# Patient Record
Sex: FEMALE | Race: BLACK OR AFRICAN AMERICAN | Employment: FULL TIME | URBAN - METROPOLITAN AREA
[De-identification: names, ages, dates, MRNs, and addresses within clinical notes are randomized per-mention and may not be internally consistent; named-entity substitution may affect disease eponyms.]

---

## 2021-10-21 ENCOUNTER — TELEPHONE (OUTPATIENT)
Dept: FAMILY MEDICINE CLINIC | Age: 56
End: 2021-10-21

## 2021-10-21 NOTE — TELEPHONE ENCOUNTER
----- Message from Олег Marley sent at 10/15/2021  2:12 PM EDT -----  Subject: Appointment Request    Reason for Call: New Patient Request Appointment    QUESTIONS  Type of Appointment? New Patient/New to Provider  Reason for appointment request? Available appointments did not meet   patient need  Additional Information for Provider? Pt has appt on  with dr Danilo Blake at   the [place] location but wanted the [facility] location, wants to   see if she can switch that location and get a sooner appt if possible   ---------------------------------------------------------------------------  --------------  [address]  What is the best way for the office to contact you? OK to leave message on   voicemail  Preferred Call Back Phone Number? [phone]  ---------------------------------------------------------------------------  --------------  SCRIPT ANSWERS  Relationship to Patient? Self  Specialty Confirmation? Primary Care  Is this the first appointment to establish care for a ? No  Have you been diagnosed with, awaiting test results for, or told that you   are suspected of having COVID-19 (Coronavirus)? (If patient has tested   negative or was tested as a requirement for work, school, or travel and   not based on symptoms, answer no)? No  Within the past two weeks have you developed any of the following symptoms   (answer no if symptoms have been present longer than 2 weeks or began   more than 2 weeks ago)? Fever or Chills, Cough, Shortness of breath or   difficulty breathing, Loss of taste or smell, Sore throat, Nasal   congestion, Sneezing or runny nose, Fatigue or generalized body aches   (answer no if pain is specific to a body part e.g. back pain), Diarrhea,   Headache? No  Have you had close contact with someone with COVID-19 in the last 14 days? No  (Service Expert  click yes below to proceed with [employer] As Usual   Scheduling)?  Yes

## 2021-11-03 ENCOUNTER — HOSPITAL ENCOUNTER (EMERGENCY)
Age: 56
Discharge: HOME | End: 2021-11-03
Attending: EMERGENCY MEDICINE
Payer: MEDICAID

## 2021-11-03 ENCOUNTER — APPOINTMENT (OUTPATIENT)
Dept: VASCULAR SURGERY | Age: 56
End: 2021-11-03
Attending: EMERGENCY MEDICINE
Payer: MEDICAID

## 2021-11-03 VITALS
WEIGHT: 210 LBS | HEIGHT: 68 IN | DIASTOLIC BLOOD PRESSURE: 90 MMHG | TEMPERATURE: 96.8 F | SYSTOLIC BLOOD PRESSURE: 150 MMHG | HEART RATE: 61 BPM | RESPIRATION RATE: 16 BRPM | BODY MASS INDEX: 31.83 KG/M2 | OXYGEN SATURATION: 98 %

## 2021-11-03 DIAGNOSIS — M79.605 LEG PAIN, MEDIAL, LEFT: Primary | ICD-10-CM

## 2021-11-03 PROCEDURE — 93971 EXTREMITY STUDY: CPT

## 2021-11-03 PROCEDURE — 99281 EMR DPT VST MAYX REQ PHY/QHP: CPT

## 2021-11-03 NOTE — PROGRESS NOTES
Left LE venous duplex completed. Verbal preliminary results given to Dr. Kinsey Baez. Final results to follow.

## 2021-11-03 NOTE — ED PROVIDER NOTES
Patient is a pleasant 67-year-old female with past medical history significant for prior PE, DVT some 14 years ago no longer on anticoagulation who presents emergency department for left leg pain. She states she traveled to [place] back in September. She also states she has been exercising a fair amount lately but had progression of her left lower leg discomfort and so wanted to come in to verify she did not have a DVT. She states 14 years ago when she had a clot she never really had any leg pain and was just short of breath. She states at that time they found clots in her lungs and her legs. She does note that she was diagnosed with a clots while pregnant but had actually been feeling short of breath prior to being pregnant. She also notes that she traveled a great deal with time and that they thought that maybe the travel was the cause of her blood clots. Denies any diagnosis of thromboembolus since that time. Denies any chest pain or shortness of breath. Denies any rash or redness or known injury. No difficulty ambulating. Past Medical History:   Diagnosis Date    Asthma     AS A CHILD    History of blood transfusion     ST[address]  [address], NO REACTION    Thromboembolus [facility]) 2005    PE       Past Surgical History:   Procedure Laterality Date    HX BREAST REDUCTION      HX  SECTION  2005    HX GYN  2004    MYOMECTOMY    HX GYN      EMERGENCY SX FOR HEMATOMA         Family History:   Problem Relation Age of Onset    Hypertension Mother     Pacemaker Father     Arthritis-osteo Maternal Grandmother     Anesth Problems Neg Hx        Social History     Socioeconomic History    Marital status:      Spouse name: Not on file    Number of children: Not on file    Years of education: Not on file    Highest education level: Not on file   Occupational History    Not on file   Tobacco Use    Smoking status: Never Smoker    Smokeless tobacco: Never Used Substance and Sexual Activity    Alcohol use: Yes     Alcohol/week: 1.7 standard drinks     Types: 2 Glasses of wine per week    Drug use: No    Sexual activity: Not on file   Other Topics Concern    Not on file   Social History Narrative    Not on file     Social Determinants of Health     Financial Resource Strain:     Difficulty of Paying Living Expenses: Not on file   Food Insecurity:     Worried About Running Out of Food in the Last Year: Not on file    Kate of Food in the Last Year: Not on file   Transportation Needs:     Lack of Transportation (Medical): Not on file    Lack of Transportation (Non-Medical): Not on file   Physical Activity:     Days of Exercise per Week: Not on file    Minutes of Exercise per Session: Not on file   Stress:     Feeling of Stress : Not on file   Social Connections:     Frequency of Communication with Friends and Family: Not on file    Frequency of Social Gatherings with Friends and Family: Not on file    Attends Shinto Services: Not on file    Active Member of [address] [place] [address] [employer] or Organizations: Not on file    Attends Club or Organization Meetings: Not on file    Marital Status: Not on file   Intimate Partner Violence:     Fear of Current or Ex-Partner: Not on file    Emotionally Abused: Not on file    Physically Abused: Not on file    Sexually Abused: Not on file   Housing Stability:     Unable to Pay for Housing in the Last Year: Not on file    Number of [place] in the Last Year: Not on file    Unstable Housing in the Last Year: Not on file         ALLERGIES: Patient has no known allergies. Review of Systems   Constitutional: Negative for fever. HENT: Negative for facial swelling. Respiratory: Negative for shortness of breath. Cardiovascular: Negative for chest pain and leg swelling. Left calf/medial leg pain   Gastrointestinal: Negative for vomiting. Skin: Negative for rash and wound.    Neurological: Negative for seizures and syncope. Psychiatric/Behavioral: Negative. Vitals:    11/03/21 1157   BP: (!) 150/90   Pulse: 61   Resp: 16   Temp: 96.8 °F (36 °C)   SpO2: 98%   Weight: 95.3 kg (210 lb)   Height: 5' 7.5\" (1.715 m)            Physical Exam  Vitals and nursing note reviewed. Constitutional:       Appearance: Normal appearance. She is not toxic-appearing or diaphoretic. HENT:      Head: Normocephalic and atraumatic. Right Ear: External ear normal.      Left Ear: External ear normal.      Nose:      Comments: Mask in place  Eyes:      General: No scleral icterus. Cardiovascular:      Pulses: Normal pulses. Pulmonary:      Effort: Pulmonary effort is normal.   Musculoskeletal:         General: No deformity. Cervical back: Neck supple. Right lower leg: No edema. Left lower leg: No edema. Skin:     General: Skin is warm and dry. Neurological:      Mental Status: She is alert and oriented to person, place, and time. Gait: Gait normal.   Psychiatric:         Mood and Affect: Mood normal.         Behavior: Behavior normal.         Thought Content: Thought content normal.         Judgment: Judgment normal.          MDM  Number of Diagnoses or Management Options  Leg pain, medial, left  Diagnosis management comments: Spoke with vascular tech who performed patient's imaging who stated that the patient study was very clearly negative and that she was very easy to image and there was no question of any thromboembolus on today's ultrasound.        Amount and/or Complexity of Data Reviewed  Tests in the radiology section of CPT®: reviewed           Procedures

## 2021-11-03 NOTE — ED TRIAGE NOTES
Pt reports left leg pain onset about 2-3 weeks ago. Denies any injury. Recently travelled to [place]. Hx of PE. Denies any leg swelling. Denies taking any anticoagulants.

## 2021-11-22 ENCOUNTER — OFFICE VISIT (OUTPATIENT)
Dept: FAMILY MEDICINE CLINIC | Age: 56
End: 2021-11-22
Payer: MEDICAID

## 2021-11-22 VITALS
TEMPERATURE: 99 F | SYSTOLIC BLOOD PRESSURE: 116 MMHG | BODY MASS INDEX: 30.65 KG/M2 | DIASTOLIC BLOOD PRESSURE: 73 MMHG | RESPIRATION RATE: 16 BRPM | WEIGHT: 202.2 LBS | HEART RATE: 56 BPM | HEIGHT: 68 IN

## 2021-11-22 DIAGNOSIS — Z86.711 HISTORY OF PULMONARY EMBOLUS (PE): ICD-10-CM

## 2021-11-22 DIAGNOSIS — Z76.89 ENCOUNTER TO ESTABLISH CARE: ICD-10-CM

## 2021-11-22 DIAGNOSIS — R79.89 LOW VITAMIN D LEVEL: ICD-10-CM

## 2021-11-22 DIAGNOSIS — Z12.11 SCREENING FOR COLON CANCER: ICD-10-CM

## 2021-11-22 DIAGNOSIS — Z87.59 HISTORY OF PULMONARY EMBOLUS DURING PREGNANCY: ICD-10-CM

## 2021-11-22 DIAGNOSIS — Z29.9 PREVENTIVE MEASURE: ICD-10-CM

## 2021-11-22 DIAGNOSIS — Z00.00 ANNUAL PHYSICAL EXAM: Primary | ICD-10-CM

## 2021-11-22 DIAGNOSIS — R25.2 LEG CRAMPS: ICD-10-CM

## 2021-11-22 DIAGNOSIS — Z86.711 HISTORY OF PULMONARY EMBOLUS DURING PREGNANCY: ICD-10-CM

## 2021-11-22 DIAGNOSIS — Z11.59 NEED FOR HEPATITIS C SCREENING TEST: ICD-10-CM

## 2021-11-22 PROCEDURE — 99386 PREV VISIT NEW AGE 40-64: CPT | Performed by: FAMILY MEDICINE

## 2021-11-22 NOTE — PATIENT INSTRUCTIONS
Kelsey Carvalho MD  [facility], [address] [address]  Office: [phone]         Muscle Cramps: Care Instructions  Your Care Instructions     A muscle cramp occurs when a muscle tightens up suddenly. A cramp often happens in the legs. A muscle cramp is also called a muscle spasm or a charley horse. Muscle cramps usually last less than a minute. However, the pain may last for several minutes. Leg cramps that occur at night may wake you up. Heavy exercise, dehydration, and being overweight can increase your risk of getting cramps. An imbalance of certain chemicals in your blood, called electrolytes, can also lead to muscle cramps. Pregnant women sometimes get muscle cramps during sleep. Muscle cramps can be treated by stretching and massaging the muscle. If cramps keep coming back, your doctor may prescribe medicine that relaxes your muscles. Follow-up care is a key part of your treatment and safety. Be sure to make and go to all appointments, and call your doctor if you are having problems. It's also a good idea to know your test results and keep a list of the medicines you take. How can you care for yourself at home? · Drink plenty of fluids to prevent dehydration. Choose water and other clear liquids until you feel better. If you have kidney, heart, or liver disease and have to limit fluids, talk with your doctor before you increase the amount of fluids you drink. · Stretch your muscles every day, especially before and after exercise and at bedtime. Regular stretching can relax your muscles and may prevent cramps. · Do not suddenly increase the amount of exercise you get. Increase your exercise a little each week. · When you get a cramp, stretch and massage the muscle. You can also take a warm shower or bath to relax the muscle. A heating pad placed on the muscle can also help. · Take a daily multivitamin supplement.   · Ask your doctor if you can take an over-the-counter pain medicine, such as acetaminophen (Tylenol), ibuprofen (Advil, Motrin), or naproxen (Aleve). Be safe with medicines. Read and follow all instructions on the label. When should you call for help? Watch closely for changes in your health, and be sure to contact your doctor if:    · You get muscle cramps often that do not go away after home treatment.     · Your muscle cramps often wake you up at night.     · You do not get better as expected. Where can you learn more? Go to http://www.guillermo.com/  Enter I565 in the search box to learn more about \"Muscle Cramps: Care Instructions. \"  Current as of: July 1, 2021               Content Version: 13.0  © 2006-2021 [employer]. Care instructions adapted under license by [employer] (which disclaims liability or warranty for this information). If you have questions about a medical condition or this instruction, always ask your healthcare professional. [place] [address] any warranty or liability for your use of this information. Gluteal Strain: Rehab Exercises  Introduction  Here are some examples of exercises for you to try. The exercises may be suggested for a condition or for rehabilitation. Start each exercise slowly. Ease off the exercises if you start to have pain. You will be told when to start these exercises and which ones will work best for you. How to do the exercises  Hip rotator stretch    1. Lie on your back with both knees bent and your feet flat on the floor. 2. Put the ankle of your affected leg on your opposite thigh near your knee. 3. Use your hand to gently push the knee of your affected leg away from your body until you feel a gentle stretch around your hip. 4. Hold the stretch for 15 to 30 seconds. 5. Repeat 2 to 4 times. 6. Repeat steps 1 through 5, but this time use your hand to gently pull your knee toward your opposite shoulder.   7. Switch legs and repeat steps 1 through 6, even if only one hip is sore. Seated hip rotator stretch    1. Sit in a sturdy chair. 2. Cross your affected leg over your knee, resting your foot on top of your knee. 3. Keep your back straight, and slowly lean forward until you feel a stretch in your hip. 4. Hold for 15 to 30 seconds. 5. Switch legs and repeat steps 1 through 4 on your other side. 6. Repeat 2 to 4 times. Hamstring stretch (lying down)    1. Lie flat on your back with your legs straight. If you feel discomfort in your back, place a small towel roll under your lower back. 2. Holding the back of your affected leg, lift your leg straight up and toward your body until you feel a stretch at the back of your thigh. 3. Hold the stretch for at least 30 seconds. 4. Repeat 2 to 4 times. 5. Switch legs and repeat steps 1 through 4, even if only one hip is sore. Bridging    1. Lie on your back with both knees bent. Your knees should be bent about 90 degrees. 2. Then push your feet into the floor, squeeze your buttocks, and lift your hips off the floor until your shoulders, hips, and knees are all in a straight line. 3. Hold for about 6 seconds as you continue to breathe normally, and then slowly lower your hips back down to the floor and rest for up to 10 seconds. 4. Repeat 8 to 12 times. Single-leg bridge    1. Lie on your back, with your arms at your sides. 2. Bend one knee, and keep that foot flat on the floor. The other leg should be straight. 3. Raise the straight leg up so that the knee is level with the bent knee. 4. Tighten your belly muscles by pulling your belly button in toward your spine. Lift your buttocks up and be careful not to let your hips drop down. 5. Hold for about 6 seconds as you continue to breathe normally, and then slowly lower your hips back down to the floor. 6. Switch legs and repeat steps 1 though 5.  7. Repeat 8 to 12 times. Follow-up care is a key part of your treatment and safety. Be sure to make and go to all appointments, and call your doctor if you are having problems. It's also a good idea to know your test results and keep a list of the medicines you take. Where can you learn more? Go to http://www.gray.com/  Enter K741 in the search box to learn more about \"Gluteal Strain: Rehab Exercises. \"  Current as of: July 1, 2021               Content Version: 13.0  © 2006-2021 [employer]. Care instructions adapted under license by [employer] (which disclaims liability or warranty for this information). If you have questions about a medical condition or this instruction, always ask your healthcare professional. [address] any warranty or liability for your use of this information.

## 2021-11-22 NOTE — PROGRESS NOTES
Charles Tena is a 55 y.o. female    Chief Complaint   Patient presents with   [address]     Patient is coming in to establish care. Patient needs a annual physical. Patient declined flu shot. No other concerns. 1. Have you been to the ER, urgent care clinic since your last visit? Hospitalized since your last visit? No    2. Have you seen or consulted any other health care providers outside of the [address] [place] [address] since your last visit? Include any pap smears or colon screening. No      Visit Vitals  /73 (BP 1 Location: Right upper arm, BP Patient Position: Sitting)   Pulse (!) 56   Temp 99 °F (37.2 °C) (Oral)   Resp 16   Ht 5' 7.5\" (1.715 m)   Wt 202 lb 3.2 oz (91.7 kg)   BMI 31.20 kg/m²           Health Maintenance Due   Topic Date Due    Hepatitis C Screening  Never done    COVID-19 Vaccine (1) 07/06/1977    DTaP/Tdap/Td series (1 - Tdap) Never done    Lipid Screen  Never done    Colorectal Cancer Screening Combo  Never done    Shingrix Vaccine Age 50> (1 of 2) Never done    Breast Cancer Screen Mammogram  07/06/2016    Flu Vaccine (1) Never done         Medication Reconciliation completed, changes noted.   Please  Update medication list.

## 2021-11-22 NOTE — PROGRESS NOTES
HPI:  Zenon Aquino is a 55 y.o. female presenting for well woman exam.     Chief Complaint   Patient presents with   [address]     Patient is coming in to establish care. Patient needs a annual physical. Patient declined flu shot. No other concerns. Acute concerns:  · Leg cramps. Having  leg cramps intermittently primarily in the left leg for the last couple of weeks. No numbness or tingling, no pain. She was concerned for DVT earlier this month and went to [facility] ER where LE Doppler was done and did not show DVT. She started to exercise recently and thinks the cramps may be related to that. Chronic medical conditions:  · No chronic medical conditions  · Has a history of PE w/o DVT during the pregnancy. Was on Lovenox. No family or personal hx of any blood disorders. GYN hx:  Receiving GYN care at [facility] (Dr. Elder Bhakta)   Last PAP done this month, no hx of abnormal PAP. S/p mammogram this year (will request the records). Social:  -ETOH-social use   -No tobacco use  No illicit drug use    Diet: Well balanced. Exercise: Goes to GYM, starting yoga twice a week     Family hx:  No family hx of breast, ovarian, colon cancer. Allergies- reviewed:   No Known Allergies      Medications- reviewed:   Current Outpatient Medications   Medication Sig    mv,calcium,min/iron/folic/vitK (ONE-A-DAY WOMEN'S COMPLETE PO) Take  by mouth.  cyanocobalamin, vitamin B-12, (VITAMIN B-12 PO) Take  by mouth.  cholecalciferol, vitamin D3, (VITAMIN D3 PO) Take  by mouth.  elderberry fruit (ELDERBERRY PO) Take  by mouth.  Lactobacillus acidophilus (PROBIOTIC PO) Take  by mouth.  oxyCODONE-acetaminophen (PERCOCET) 5-325 mg per tablet Take 1 Tab by mouth every four (4) hours as needed for Pain. (Patient not taking: Reported on 11/22/2021)    ibuprofen (MOTRIN) 600 mg tablet Take 1 Tab by mouth every six (6) hours as needed for Pain.  (Patient not taking: Reported on 11/22/2021)    enoxaparin (LOVENOX) 40 mg/0.4 mL 0.4 mL by SubCUTAneous route daily. (Patient not taking: Reported on 2021)     No current facility-administered medications for this visit. Past Medical History- reviewed:  Past Medical History:   Diagnosis Date    Asthma     AS A CHILD    History of blood transfusion     [address], NO REACTION    Thromboembolus ([address] 2005    PE         Past Surgical History- reviewed:   Past Surgical History:   Procedure Laterality Date    HX BREAST REDUCTION      HX  SECTION  2005    HX GYN  2004    MYOMECTOMY    HX GYN      EMERGENCY SX FOR HEMATOMA         Family History - reviewed:  Family History   Problem Relation Age of Onset    Hypertension Mother     Pacemaker Father     Arthritis-osteo Maternal Grandmother     Anesth Problems Neg Hx          Social History - reviewed:  Social History     Socioeconomic History    Marital status:      Spouse name: Not on file    Number of children: Not on file    Years of education: Not on file    Highest education level: Not on file   Occupational History    Not on file   Tobacco Use    Smoking status: Never Smoker    Smokeless tobacco: Never Used   Substance and Sexual Activity    Alcohol use: Yes     Alcohol/week: 1.7 standard drinks     Types: 2 Glasses of wine per week    Drug use: No    Sexual activity: Not on file   Other Topics Concern    Not on file   Social History Narrative    Not on file     Social Determinants of Health     Financial Resource Strain:     Difficulty of Paying Living Expenses: Not on file   Food Insecurity:     Worried About Running Out of Food in the Last Year: Not on file    Kate of Food in the Last Year: Not on file   Transportation Needs:     Lack of Transportation (Medical): Not on file    Lack of Transportation (Non-Medical):  Not on file   Physical Activity:     Days of Exercise per Week: Not on file    Minutes of Exercise per Session: Not on file   Stress:     Feeling of Stress : Not on file   Social Connections:     Frequency of Communication with Friends and Family: Not on file    Frequency of Social Gatherings with Friends and Family: Not on file    Attends Jainism Services: Not on file    Active Member of [address] [place] [address] or Organizations: Not on file    Attends Club or Organization Meetings: Not on file    Marital Status: Not on file   Intimate Partner Violence:     Fear of Current or Ex-Partner: Not on file    Emotionally Abused: Not on file    Physically Abused: Not on file    Sexually Abused: Not on file   Housing Stability:     Unable to Pay for Housing in the Last Year: Not on file    Number of [place] in the Last Year: Not on file    Unstable Housing in the Last Year: Not on file         Immunizations - reviewed:   Immunization History   Administered Date(s) Administered    COVID-19, [employer], 5-11 Years, PF, 10mcg/0.2mL Dose 03/24/2021, 04/14/2021, 10/14/2021       Tdap: Declined today   Zostervax: Declined today, discussed today and she may consider it in the future.       Health Maintenance reviewed -  Pap smear was done this year with GYN, will request the record   Mammogram was done this year with GYN, will request the record   Colonoscopy Never done, will give the referral today      Review of Systems   CONSTITUTIONAL: Denies: fever, chills  CARDIOVASCULAR: Denies: chest pain, dyspnea on exertion  RESPIRATORY: Denies: cough, shortness of breath  ENDOCRINE: Denies: polydipsia/polyuria, palpitations  GI: Denies: abdominal pain, constipation, blood in stool  : Denies: dysuria, frequency/urgency  NEURO: Denies: dizzy/vertigo, headache  MUSCULOSKELETAL: Denies: back pain, joint pain      Physical Exam  Visit Vitals  /73 (BP 1 Location: Right upper arm, BP Patient Position: Sitting)   Pulse (!) 56   Temp 99 °F (37.2 °C) (Oral)   Resp 16   Ht 5' 7.5\" (1.715 m)   Wt 202 lb 3.2 oz (91.7 kg)   LMP 04/23/2014 BMI 31.20 kg/m²       General appearance - alert, well appearing, and in no distress  Eyes - pupils equal and reactive, extraocular eye movements intact  Neck - supple, no significant adenopathy  Chest - clear to auscultation, no wheezes, rales or rhonchi, symmetric air entry  Heart - normal rate, regular rhythm, normal S1, S2, no murmurs, rubs, clicks or gallops  Abdomen - soft, nontender, nondistended, no masses or organomegaly  Neurological - alert, oriented, normal speech, no focal findings or movement disorder noted  Musculoskeletal - no joint tenderness, deformity or swelling  Extremities - peripheral pulses normal, no pedal edema, no clubbing or cyanosis  Skin - normal coloration and turgor, no rashes, no suspicious skin lesions noted, both calves are symmetrical and equal in size. Negative Homans' sign bilaterally. Assessment/Plan:    ICD-10-CM ICD-9-CM    1. Annual physical exam  Z00.00 V70.0    2. Preventive measure  Z29.9 V07.9 CBC W/O DIFF      METABOLIC PANEL, COMPREHENSIVE      HEMOGLOBIN A1C WITH EAG      LIPID PANEL      LIPID PANEL      HEMOGLOBIN A1C WITH EAG      METABOLIC PANEL, COMPREHENSIVE      CBC W/O DIFF   3. Encounter to establish care  Z76.89 V65.8    4. Need for hepatitis C screening test  Z11.59 V73.89 HEPATITIS C AB      HEPATITIS C AB   5. Low vitamin D level  R79.89 790.6 VITAMIN D, 25 HYDROXY      VITAMIN D, 25 HYDROXY   6. Screening for colon cancer  Z12.11 V76.51 REFERRAL TO GASTROENTEROLOGY   7. Leg cramps  R25.2 729.82 IRON PROFILE      FERRITIN      FERRITIN      IRON PROFILE   8. History of pulmonary embolus (PE)  Z86.711 V12.55    9. History of pulmonary embolus during pregnancy  Z86.711 V12.51     Z87.59         1. Preventive:  · Counseled re: diet, exercise, healthy lifestyle  · Appropriate labs, vaccines, imaging studies, and referrals ordered as listed above    2. Muscle cramps  No concerns for DVT. No calves size discrepancy, negative Homans' sign bl.  The ER course was personally reviewed including Doppler for LE which was negative for DVT. Will check CMP to evaluate for possible electrolyte imbalance and check iron studies as well. Follow-up and Dispositions    · Return in about 1 year (around 11/22/2022). I have discussed the diagnosis with the patient and the intended plan as seen in the above orders. Patient verbalized understanding of the plan and agrees with the plan. The patient has received an after-visit summary and questions were answered concerning future plans. I have discussed medication side effects and warnings with the patient as well. Informed patient to return to the office if new symptoms arise.         Rachele Alvarez MD  Family Medicine Attending

## 2021-11-23 PROBLEM — Z86.711 HISTORY OF PULMONARY EMBOLUS DURING PREGNANCY: Status: ACTIVE | Noted: 2021-11-23

## 2021-11-23 PROBLEM — Z87.59 HISTORY OF PULMONARY EMBOLUS DURING PREGNANCY: Status: ACTIVE | Noted: 2021-11-23

## 2021-11-23 LAB
25(OH)D3+25(OH)D2 SERPL-MCNC: 33.2 NG/ML (ref 30–100)
ALBUMIN SERPL-MCNC: 4.9 G/DL (ref 3.8–4.9)
ALBUMIN/GLOB SERPL: 2 {RATIO} (ref 1.2–2.2)
ALP SERPL-CCNC: 55 IU/L (ref 44–121)
ALT SERPL-CCNC: 17 IU/L (ref 0–32)
AST SERPL-CCNC: 17 IU/L (ref 0–40)
BILIRUB SERPL-MCNC: 0.4 MG/DL (ref 0–1.2)
BUN SERPL-MCNC: 11 MG/DL (ref 6–24)
BUN/CREAT SERPL: 13 (ref 9–23)
CALCIUM SERPL-MCNC: 9.8 MG/DL (ref 8.7–10.2)
CHLORIDE SERPL-SCNC: 103 MMOL/L (ref 96–106)
CHOLEST SERPL-MCNC: 236 MG/DL (ref 100–199)
CO2 SERPL-SCNC: 24 MMOL/L (ref 20–29)
CREAT SERPL-MCNC: 0.88 MG/DL (ref 0.57–1)
ERYTHROCYTE [DISTWIDTH] IN BLOOD BY AUTOMATED COUNT: 14 % (ref 11.7–15.4)
EST. AVERAGE GLUCOSE BLD GHB EST-MCNC: 123 MG/DL
FERRITIN SERPL-MCNC: 177 NG/ML (ref 15–150)
GLOBULIN SER CALC-MCNC: 2.5 G/DL (ref 1.5–4.5)
GLUCOSE SERPL-MCNC: 86 MG/DL (ref 65–99)
HBA1C MFR BLD: 5.9 % (ref 4.8–5.6)
HCT VFR BLD AUTO: 41.5 % (ref 34–46.6)
HCV AB S/CO SERPL IA: 0.2 S/CO RATIO (ref 0–0.9)
HDLC SERPL-MCNC: 69 MG/DL
HGB BLD-MCNC: 13.4 G/DL (ref 11.1–15.9)
IMP & REVIEW OF LAB RESULTS: NORMAL
IRON SATN MFR SERPL: 32 % (ref 15–55)
IRON SERPL-MCNC: 85 UG/DL (ref 27–159)
LDLC SERPL CALC-MCNC: 153 MG/DL (ref 0–99)
MCH RBC QN AUTO: 27.7 PG (ref 26.6–33)
MCHC RBC AUTO-ENTMCNC: 32.3 G/DL (ref 31.5–35.7)
MCV RBC AUTO: 86 FL (ref 79–97)
PLATELET # BLD AUTO: 251 X10E3/UL (ref 150–450)
POTASSIUM SERPL-SCNC: 4.4 MMOL/L (ref 3.5–5.2)
PROT SERPL-MCNC: 7.4 G/DL (ref 6–8.5)
RBC # BLD AUTO: 4.84 X10E6/UL (ref 3.77–5.28)
SODIUM SERPL-SCNC: 141 MMOL/L (ref 134–144)
TIBC SERPL-MCNC: 263 UG/DL (ref 250–450)
TRIGL SERPL-MCNC: 80 MG/DL (ref 0–149)
UIBC SERPL-MCNC: 178 UG/DL (ref 131–425)
VLDLC SERPL CALC-MCNC: 14 MG/DL (ref 5–40)
WBC # BLD AUTO: 8.8 X10E3/UL (ref 3.4–10.8)

## 2021-11-24 ENCOUNTER — TELEPHONE (OUTPATIENT)
Dept: FAMILY MEDICINE CLINIC | Age: 56
End: 2021-11-24

## 2021-11-24 DIAGNOSIS — R73.03 PREDIABETES: Primary | ICD-10-CM

## 2021-11-24 NOTE — PROGRESS NOTES
CBC and CMP WNL. HgA1C at 5.9 at prediabetic range, will need to recheck in 6 months. Lipid panel with , TG 80, HDL 69, . 10 years ASCVD risk is 2.1, no indications for statin. Will recheck lipid panel in 6 months to ensure LDL is still < 190. HepC NR. VitD 33.2, at goal.  Iron profile WNL. Will call the patient to discuss.

## 2021-11-24 NOTE — TELEPHONE ENCOUNTER
Called the patient and discussed the results. Will check HgA1C and lipid panel in 6 months.      5:35 PM  11/24/2021  Jane Boss MD

## 2021-12-16 NOTE — PERIOP NOTES
Informed patient of COVID requirements, patient to complete COVID curbside testing at [address] [place] [address] Thursday, December 23rd between 2014-7486. Patient verbalized understanding that COVID test is required to proceed with procedure.

## 2021-12-21 NOTE — PROGRESS NOTES
[address] [place] [address] Dr Bhatia Preprocedure Instructions      1. On the day of your procedure, please report to registration located on the 2nd floor of the  [facility]. yes    2. You must have a responsible adult to drive you to the hospital, stay at the hospital during your procedure and drive you home. If they leave your procedure will not be started (no exceptions). yes    3. Do not have anything to eat or drink (including water, gum, mints, coffee, and juice) after midnight. This does not apply to the medications you were instructed to take by your physician. yes  If you are currently taking Plavix, Coumadin, Aspirin, or other blood-thinning agents, contact your physician for special instructions. yes    4. If you are having a procedure that requires bowel prep: We recommend that you have only clear liquids the day before your procedure and begin your bowel prep by 5:00 pm.  You may continue to drink clear liquids until midnight. If for any reason you are not able to complete your prep please notify your physician immediately. yes    5. Have a list of all current medications, including vitamins, herbal supplements and any other over the counter medications. yes    6. If you wear glasses, contacts, dentures and/or hearing aids, they may be removed prior to procedure, please bring a case to store them in. yes    7. You should understand that if you do not follow these instructions your procedure may be cancelled. If your physical condition changes (I.e. fever, cold or flu) please contact your doctor as soon as possible. 8. It is important that you be on time. If for any reason you are unable to keep your appointment please call [phone] the day of or your physicians office prior to your scheduled procedure    9.  Have you received your COVID Vaccine? yes If no, you will need to receive a COVID test/swab here at [facility] the MOB parking lot Monday - Friday 8a - 11am. There are no Saturday or Sunday swabbing at any [facility] [employer] facility. (patient verbalizes understanding) yes

## 2021-12-23 ENCOUNTER — HOSPITAL ENCOUNTER (OUTPATIENT)
Dept: LAB | Age: 56
Discharge: HOME | End: 2021-12-23
Payer: MEDICAID

## 2021-12-23 ENCOUNTER — TRANSCRIBE ORDER (OUTPATIENT)
Dept: EMERGENCY DEPT | Age: 56
End: 2021-12-23

## 2021-12-23 DIAGNOSIS — Z01.812 PRE-OPERATIVE LABORATORY EXAMINATION: ICD-10-CM

## 2021-12-23 DIAGNOSIS — Z01.812 PRE-OPERATIVE LABORATORY EXAMINATION: Primary | ICD-10-CM

## 2021-12-23 PROCEDURE — U0005 INFEC AGEN DETEC AMPLI PROBE: HCPCS

## 2021-12-25 LAB
SARS-COV-2, XPLCVT: NOT DETECTED
SOURCE, COVRS: NORMAL

## 2021-12-28 ENCOUNTER — HOSPITAL ENCOUNTER (OUTPATIENT)
Age: 56
Setting detail: OUTPATIENT SURGERY
Discharge: HOME | End: 2021-12-28
Attending: INTERNAL MEDICINE | Admitting: INTERNAL MEDICINE
Payer: MEDICAID

## 2021-12-28 ENCOUNTER — ANESTHESIA (OUTPATIENT)
Dept: ENDOSCOPY | Age: 56
End: 2021-12-28
Payer: MEDICAID

## 2021-12-28 ENCOUNTER — ANESTHESIA EVENT (OUTPATIENT)
Dept: ENDOSCOPY | Age: 56
End: 2021-12-28
Payer: MEDICAID

## 2021-12-28 VITALS
HEIGHT: 67 IN | BODY MASS INDEX: 31.31 KG/M2 | OXYGEN SATURATION: 100 % | SYSTOLIC BLOOD PRESSURE: 139 MMHG | WEIGHT: 199.52 LBS | HEART RATE: 60 BPM | RESPIRATION RATE: 16 BRPM | DIASTOLIC BLOOD PRESSURE: 69 MMHG | TEMPERATURE: 97.8 F

## 2021-12-28 PROCEDURE — 74011250636 HC RX REV CODE- 250/636: Performed by: NURSE ANESTHETIST, CERTIFIED REGISTERED

## 2021-12-28 PROCEDURE — 76040000019: Performed by: INTERNAL MEDICINE

## 2021-12-28 PROCEDURE — 76060000031 HC ANESTHESIA FIRST 0.5 HR: Performed by: INTERNAL MEDICINE

## 2021-12-28 PROCEDURE — 74011000250 HC RX REV CODE- 250: Performed by: NURSE ANESTHETIST, CERTIFIED REGISTERED

## 2021-12-28 PROCEDURE — 77030013992 HC SNR POLYP ENDOSC BSC -B: Performed by: INTERNAL MEDICINE

## 2021-12-28 PROCEDURE — 2709999900 HC NON-CHARGEABLE SUPPLY: Performed by: INTERNAL MEDICINE

## 2021-12-28 PROCEDURE — 88305 TISSUE EXAM BY PATHOLOGIST: CPT

## 2021-12-28 PROCEDURE — 74011250636 HC RX REV CODE- 250/636: Performed by: INTERNAL MEDICINE

## 2021-12-28 RX ORDER — PROPOFOL 10 MG/ML
INJECTION, EMULSION INTRAVENOUS
Status: DISCONTINUED | OUTPATIENT
Start: 2021-12-28 | End: 2021-12-28 | Stop reason: HOSPADM

## 2021-12-28 RX ORDER — GLYCOPYRROLATE 0.2 MG/ML
INJECTION INTRAMUSCULAR; INTRAVENOUS AS NEEDED
Status: DISCONTINUED | OUTPATIENT
Start: 2021-12-28 | End: 2021-12-28 | Stop reason: HOSPADM

## 2021-12-28 RX ORDER — LIDOCAINE HYDROCHLORIDE 20 MG/ML
INJECTION, SOLUTION EPIDURAL; INFILTRATION; INTRACAUDAL; PERINEURAL AS NEEDED
Status: DISCONTINUED | OUTPATIENT
Start: 2021-12-28 | End: 2021-12-28 | Stop reason: HOSPADM

## 2021-12-28 RX ORDER — SODIUM CHLORIDE 9 MG/ML
50 INJECTION, SOLUTION INTRAVENOUS CONTINUOUS
Status: DISCONTINUED | OUTPATIENT
Start: 2021-12-28 | End: 2021-12-28 | Stop reason: HOSPADM

## 2021-12-28 RX ORDER — NALOXONE HYDROCHLORIDE 0.4 MG/ML
0.4 INJECTION, SOLUTION INTRAMUSCULAR; INTRAVENOUS; SUBCUTANEOUS
Status: DISCONTINUED | OUTPATIENT
Start: 2021-12-28 | End: 2021-12-28 | Stop reason: HOSPADM

## 2021-12-28 RX ORDER — EPINEPHRINE 0.1 MG/ML
1 INJECTION INTRACARDIAC; INTRAVENOUS
Status: DISCONTINUED | OUTPATIENT
Start: 2021-12-28 | End: 2021-12-28 | Stop reason: HOSPADM

## 2021-12-28 RX ORDER — PROPOFOL 10 MG/ML
INJECTION, EMULSION INTRAVENOUS AS NEEDED
Status: DISCONTINUED | OUTPATIENT
Start: 2021-12-28 | End: 2021-12-28 | Stop reason: HOSPADM

## 2021-12-28 RX ORDER — SODIUM CHLORIDE 9 MG/ML
INJECTION, SOLUTION INTRAVENOUS
Status: DISCONTINUED | OUTPATIENT
Start: 2021-12-28 | End: 2021-12-28 | Stop reason: HOSPADM

## 2021-12-28 RX ORDER — FLUMAZENIL 0.1 MG/ML
0.2 INJECTION INTRAVENOUS
Status: DISCONTINUED | OUTPATIENT
Start: 2021-12-28 | End: 2021-12-28 | Stop reason: HOSPADM

## 2021-12-28 RX ORDER — DEXTROMETHORPHAN/PSEUDOEPHED 2.5-7.5/.8
1.2 DROPS ORAL
Status: DISCONTINUED | OUTPATIENT
Start: 2021-12-28 | End: 2021-12-28 | Stop reason: HOSPADM

## 2021-12-28 RX ORDER — ATROPINE SULFATE 0.1 MG/ML
0.4 INJECTION INTRAVENOUS
Status: DISCONTINUED | OUTPATIENT
Start: 2021-12-28 | End: 2021-12-28 | Stop reason: HOSPADM

## 2021-12-28 RX ORDER — MIDAZOLAM HYDROCHLORIDE 1 MG/ML
.25-5 INJECTION, SOLUTION INTRAMUSCULAR; INTRAVENOUS
Status: DISCONTINUED | OUTPATIENT
Start: 2021-12-28 | End: 2021-12-28 | Stop reason: HOSPADM

## 2021-12-28 RX ADMIN — PROPOFOL INJECTABLE EMULSION 30 MG: 10 INJECTION, EMULSION INTRAVENOUS at 12:48

## 2021-12-28 RX ADMIN — PROPOFOL INJECTABLE EMULSION 50 MG: 10 INJECTION, EMULSION INTRAVENOUS at 12:47

## 2021-12-28 RX ADMIN — GLYCOPYRROLATE 0.2 MG: 0.2 INJECTION INTRAMUSCULAR; INTRAVENOUS at 12:47

## 2021-12-28 RX ADMIN — SODIUM CHLORIDE: 9 INJECTION, SOLUTION INTRAVENOUS at 12:36

## 2021-12-28 RX ADMIN — PROPOFOL 150 MCG/KG/MIN: 10 INJECTION, EMULSION INTRAVENOUS at 12:48

## 2021-12-28 RX ADMIN — SODIUM CHLORIDE 50 ML/HR: 9 INJECTION, SOLUTION INTRAVENOUS at 12:00

## 2021-12-28 RX ADMIN — LIDOCAINE HYDROCHLORIDE 60 MG: 20 INJECTION, SOLUTION INTRAVENOUS at 12:48

## 2021-12-28 NOTE — ANESTHESIA POSTPROCEDURE EVALUATION
Procedure(s):  COLONOSCOPY  ENDOSCOPIC POLYPECTOMY. MAC    Anesthesia Post Evaluation      Multimodal analgesia: multimodal analgesia not used between 6 hours prior to anesthesia start to PACU discharge  Patient location during evaluation: PACU  Patient participation: complete - patient participated  Level of consciousness: awake  Pain management: adequate  Airway patency: patent  Anesthetic complications: no  Cardiovascular status: acceptable, blood pressure returned to baseline and hemodynamically stable  Respiratory status: acceptable  Hydration status: acceptable  Post anesthesia nausea and vomiting:  controlled  Final Post Anesthesia Temperature Assessment:  Normothermia (36.0-37.5 degrees C)      INITIAL Post-op Vital signs:   Vitals Value Taken Time   /74 12/28/21 1354   Temp 36.6 °C (97.8 °F) 12/28/21 1310   Pulse 60 12/28/21 1335   Resp 16 12/28/21 1335   SpO2 100 % 12/28/21 1345   Vitals shown include unvalidated device data.

## 2021-12-28 NOTE — H&P
Ivan Mccord M.D.  [phone]            History and Physical       NAME:  Sandra Mittal   :   1965   MRN:   [id number]       Referring Physician:  Dr. Sarahi Tirado Date: 2021 12:44 PM    Chief Complaint:  Colon cancer screening    History of Present Illness:  Patient is a 55 y.o. who is seen for colon cancer screening. Denies any ongoing GI complaints. PMH:  Past Medical History:   Diagnosis Date    Asthma     AS A CHILD    History of blood transfusion     [address], NO REACTION    Thromboembolus ([address] 2005    PE       PSH:  Past Surgical History:   Procedure Laterality Date    HX BREAST REDUCTION      HX  SECTION  2005    HX GYN      MYOMECTOMY    HX GYN      EMERGENCY SX FOR HEMATOMA       Allergies:  No Known Allergies    Home Medications:  Prior to Admission Medications   Prescriptions Last Dose Informant Patient Reported? Taking? Lactobacillus acidophilus (PROBIOTIC PO) 2021 at Unknown time  Yes Yes   Sig: Take  by mouth. cholecalciferol, vitamin D3, (VITAMIN D3 PO) 2021 at Unknown time  Yes Yes   Sig: Take  by mouth.   cyanocobalamin, vitamin B-12, (VITAMIN B-12 PO) 2021 at Unknown time  Yes Yes   Sig: Take  by mouth.   elderberry fruit (ELDERBERRY PO) 2021 at Unknown time  Yes Yes   Sig: Take  by mouth. ibuprofen (MOTRIN) 600 mg tablet Unknown at Unknown time  No No   Sig: Take 1 Tab by mouth every six (6) hours as needed for Pain. Patient not taking: Reported on 2021   mv,calcium,min/iron/folic/vitK (ONE-A-DAY WOMEN'S COMPLETE PO) 2021 at Unknown time  Yes Yes   Sig: Take  by mouth.       Facility-Administered Medications: None       Hospital Medications:  Current Facility-Administered Medications   Medication Dose Route Frequency    0.9% sodium chloride infusion  50 mL/hr IntraVENous CONTINUOUS    midazolam (VERSED) injection 0.25-5 mg  0.25-5 mg IntraVENous Multiple    naloxone (NARCAN) injection 0.4 mg  0.4 mg IntraVENous Multiple    flumazeniL (ROMAZICON) 0.1 mg/mL injection 0.2 mg  0.2 mg IntraVENous Multiple    simethicone (MYLICON) 20DQ/5.8BO oral drops 80 mg  1.2 mL Oral Multiple    atropine injection 0.4 mg  0.4 mg IntraVENous ONCE PRN    EPINEPHrine (ADRENALIN) 0.1 mg/mL syringe 1 mg  1 mg Endoscopically ONCE PRN       Social History:  Social History     Tobacco Use    Smoking status: Never Smoker    Smokeless tobacco: Never Used   Substance Use Topics    Alcohol use: Yes     Alcohol/week: 1.7 standard drinks     Types: 2 Glasses of wine per week       Family History:  Family History   Problem Relation Age of Onset    Hypertension Mother     Pacemaker Father     OSTEOARTHRITIS Maternal Grandmother     Anesth Problems Neg Hx              Review of Systems:      Constitutional: negative fever, negative chills, negative weight loss  Eyes:   negative visual changes  ENT:   negative sore throat, tongue or lip swelling  Respiratory:  negative cough, negative dyspnea  Cards:  negative for chest pain, palpitations, lower extremity edema  GI:   See HPI  :  negative for frequency, dysuria  Integument:  negative for rash and pruritus  Heme:  negative for easy bruising and gum/nose bleeding  Musculoskel: negative for myalgias,  back pain and muscle weakness  Neuro: negative for headaches, dizziness, vertigo  Psych:  negative for feelings of anxiety, depression       Objective:     Patient Vitals for the past 8 hrs:   BP Temp Pulse Resp SpO2 Height Weight   12/28/21 1129 126/66 98.7 °F (37.1 °C) (!) 52 14 99 % 5' 7\" (1.702 m) 90.5 kg (199 lb 8.3 oz)     No intake/output data recorded. No intake/output data recorded.     EXAM:     NEURO-a&o   HEENT-wnl   LUNGS-clear    COR-regular rate and rhythym     ABD-soft , no tenderness, no rebound, good bs     EXT-no edema     Data Review     No results for input(s): WBC, HGB, HCT, PLT, HGBEXT, HCTEXT, PLTEXT in the last 72 hours. No results for input(s): NA, K, CL, CO2, BUN, CREA, GLU, PHOS, CA in the last 72 hours. No results for input(s): AP, TBIL, TP, ALB, GLOB, GGT, AML, LPSE in the last 72 hours. No lab exists for component: SGOT, GPT, AMYP, HLPSE  No results for input(s): INR, PTP, APTT, INREXT in the last 72 hours. Patient Active Problem List   Diagnosis Code    History of pulmonary embolus during pregnancy Z86.711, Z87.59      Assessment:   · Colon cancer screening   Plan:   · Colonoscopy today.      Signed By: Irene Lechuga MD     12/28/2021  12:44 PM

## 2021-12-28 NOTE — PROGRESS NOTES
Endoscopy discharge instructions have been reviewed and given to patient. The patient verbalized understanding and acceptance of instructions. Dr. Meena Quispe discussed with patient procedure findings and next steps.

## 2021-12-28 NOTE — PERIOP NOTES
1248  Timeout performed. Anesthesia staff at patient's bedside administering anesthesia and monitoring patients vital signs throughout procedure. See anesthesia note. Post procedure, report received from CRNA, Chestine Nageotte.    1300  Endoscope was pre-cleaned at bedside immediately following procedure by endo Sloan ruiz RN.     8850  Patient tolerated procedure. Abdomen soft and patient arousable and voices no complaints. Patient transported to endoscopy recovery area. Report given to post procedure Lien LOWE.

## 2021-12-28 NOTE — PROGRESS NOTES
Mireya Espinal  1965  [id number]    Situation:  Verbal report received from: Elissa Suarez RN   Procedure: Procedure(s):  COLONOSCOPY  ENDOSCOPIC POLYPECTOMY    Background:    Preoperative diagnosis: COLON SCREENING  Postoperative diagnosis: 1 colon polyp removed. Hemorrhoids. :  Dr. Bertin Carvalho   Assistant(s): Endoscopy RN-1: Alma Clinton RN; Mireya Espinal RN    Specimens:   ID Type Source Tests Collected by Time Destination   1 : cecum polyp Preservative Cecum  Ariel Cowart MD 12/28/2021 1255 Pathology     H. Pylori  no    Assessment:  Intra-procedure medications       Anesthesia gave intra-procedure sedation and medications, see anesthesia flow sheet yes    Intravenous fluids: NS@ KVO     Vital signs stable   yes    Abdominal assessment: round and soft   yes    Recommendation:  Discharge patient per MD order  yes.   Return to floor  outpatient  Family or Friend   Daughter   Permission to share finding with family or friend yes

## 2021-12-28 NOTE — ANESTHESIA PREPROCEDURE EVALUATION
Anesthetic History   No history of anesthetic complications            Review of Systems / Medical History  Patient summary reviewed and pertinent labs reviewed    Pulmonary  Within defined limits                 Neuro/Psych   Within defined limits           Cardiovascular  Within defined limits                     GI/Hepatic/Renal  Within defined limits              Endo/Other  Within defined limits           Other Findings   Comments: 2005 DVT while pregnant           Physical Exam    Airway  Mallampati: II    Neck ROM: normal range of motion   Mouth opening: Normal     Cardiovascular    Rhythm: regular  Rate: normal         Dental  No notable dental hx       Pulmonary  Breath sounds clear to auscultation               Abdominal  GI exam deferred       Other Findings            Anesthetic Plan    ASA: 1  Anesthesia type: MAC          Induction: Intravenous  Anesthetic plan and risks discussed with: Patient

## 2021-12-28 NOTE — PROGRESS NOTES
Patient states her left side abdominal gas pain in now at a level 4. Patient states she is ready for discharge to home.

## 2021-12-28 NOTE — DISCHARGE INSTRUCTIONS
[address]. Alla Garrison M.D.  [phone]            COLON DISCHARGE INSTRUCTIONS       2021    Sarina Bishop  :  1965  [facility] Medical Record Number:  [id number]      COLONOSCOPY FINDINGS:  Your colonoscopy showed: One diminutive polyp that was removed and sent to pathology, small internal hemorrhoids, otherwise looked within normal.    DISCOMFORT:  Redness at IV site- apply warm compress to area; if redness or soreness persist- contact your physician  There may be a slight amount of blood passed from the rectum  Gaseous discomfort- walking, belching will help relieve any discomfort  You may not operate a vehicle for 12 hours  You may not engage in an occupation involving machinery or appliances for rest of today  You may not drink alcoholic beverages for at least 12 hours  Avoid making any critical decisions for at least 24 hour  DIET:   High fiber diet. - however -  remember your colon is empty and a heavy meal will produce gas. Avoid these foods:  vegetables, fried / greasy foods, carbonated drinks for today     ACTIVITY:  You may resume your normal daily activities it is recommended that you spend the remainder of the day resting -  avoid any strenuous activity. CALL M.D. ANY SIGN OF:   Increasing pain, nausea, vomiting  Abdominal distension (swelling)  New increased bleeding (oral or rectal)  Fever (chills)  Pain in chest area  Bloody discharge from nose or mouth   Shortness of breath    Follow-up Instructions:   Call Dr. Chelle Ricketts if any questions or problems. Telephone # [phone]  Biopsy results will be available in  5 to 7 days  Repeat colonoscopy in 7 to 10 years based on pathology results.

## 2021-12-28 NOTE — PROCEDURES
Holly Butler M.D.  [phone]            2021          Colonoscopy Operative Report  Americo Coronado  :  1965  [facility] Medical Record Number:  [id number]      Indications:    Screening colonoscopy     :  To Drew MD    Referring Provider: Dleio Conte MD    Sedation:  MAC anesthesia    Pre-Procedural Exam:      Airway: clear,  No airway problems anticipated  Heart: RRR, without gallops or rubs  Lungs: clear bilaterally without wheezes, crackles, or rhonchi  Abdomen: soft, nontender, nondistended, bowel sounds present  Mental Status: awake, alert and oriented to person, place and time     Procedure Details:  After informed consent was obtained with all risks and benefits of procedure explained and preoperative exam completed, the patient was taken to the endoscopy suite and placed in the left lateral decubitus position. Upon sequential sedation as per above, a digital rectal exam was performed. The Olympus videocolonoscope  was inserted in the rectum and carefully advanced to the cecum, which was identified by the ileocecal valve and appendiceal orifice. The quality of preparation was good. The colonoscope was slowly withdrawn with careful inspection and evaluation between folds. Retroflexion in the rectum was performed. Findings:   Terminal Ileum: normal  Cecum: 1  Sessile polyp(s), the largest 3 mm in size;  Ascending Colon: normal  Transverse Colon: normal  Descending Colon: normal  Sigmoid: normal  Rectum: no mucosal lesion appreciated  Grade 1 internal hemorrhoid(s); Interventions:  1 complete polypectomy were performed using cold snare  and the polyps were  retrieved    Specimen Removed:  specimen #1, 3 mm in size, located in the cecum removed by cold snare and retrieved for pathology    Complications: None. EBL:  None.     Impression:  One diminutive polyp removed and sent to pathology, otherwise mucosa within normal.    Recommendations:  -If adenoma is present, repeat colonoscopy in 7 years.   -High fiber diet.    -Resume normal medication(s). Discharge Disposition:  Home in the company of a  when able to ambulate.     Geraldene Merlin, MD  12/28/2021  1:01 PM

## 2021-12-28 NOTE — PROGRESS NOTES
Patient states she is having gas pain in her left abdominal area. Patient up to restroom and ambulating in department in effort to pass the gas. / Junior Zhou is aware.

## 2022-03-19 PROBLEM — Z87.59 HISTORY OF PULMONARY EMBOLUS DURING PREGNANCY: Status: ACTIVE | Noted: 2021-11-23

## 2022-03-19 PROBLEM — Z86.711 HISTORY OF PULMONARY EMBOLUS DURING PREGNANCY: Status: ACTIVE | Noted: 2021-11-23

## 2022-05-09 ENCOUNTER — TELEPHONE (OUTPATIENT)
Dept: FAMILY MEDICINE CLINIC | Age: 57
End: 2022-05-09

## 2022-05-09 NOTE — TELEPHONE ENCOUNTER
I called pt to schedule lab appt,i left pt a v/m to give us a call back.     per Dr Jaydon Feldman Please call the patient and schedule LAB appointment for lipid and HgA1C check (fasting).

## 2022-05-09 NOTE — TELEPHONE ENCOUNTER
I called pt to schedule lab appt    per Dr Viveros Friendly Please call the patient and schedule LAB appointment for lipid and HgA1C check (fasting).

## 2023-02-06 ENCOUNTER — OFFICE VISIT (OUTPATIENT)
Dept: FAMILY MEDICINE CLINIC | Age: 58
End: 2023-02-06
Payer: MEDICAID

## 2023-02-06 VITALS
DIASTOLIC BLOOD PRESSURE: 80 MMHG | HEART RATE: 66 BPM | HEIGHT: 67 IN | WEIGHT: 210.6 LBS | TEMPERATURE: 98.1 F | BODY MASS INDEX: 33.06 KG/M2 | SYSTOLIC BLOOD PRESSURE: 131 MMHG | OXYGEN SATURATION: 98 %

## 2023-02-06 DIAGNOSIS — R73.03 PREDIABETES: ICD-10-CM

## 2023-02-06 DIAGNOSIS — E78.5 HYPERLIPIDEMIA, UNSPECIFIED HYPERLIPIDEMIA TYPE: ICD-10-CM

## 2023-02-06 DIAGNOSIS — G89.29 CHRONIC PAIN OF RIGHT KNEE: ICD-10-CM

## 2023-02-06 DIAGNOSIS — Z00.00 WELL WOMAN EXAM (NO GYNECOLOGICAL EXAM): Primary | ICD-10-CM

## 2023-02-06 DIAGNOSIS — M25.561 CHRONIC PAIN OF RIGHT KNEE: ICD-10-CM

## 2023-02-06 PROCEDURE — 99396 PREV VISIT EST AGE 40-64: CPT | Performed by: FAMILY MEDICINE

## 2023-02-06 NOTE — PROGRESS NOTES
HPI:  Daniela Mujica is a 65 y.o. female presenting for well woman exam.     Chief Complaint   Patient presents with    Physical     Acute concerns:  -No acute concerns   -Has been having intermittent pain in her right knee aggravated by physical activity and completely alleviated by rest. No associated numbness or tingling in the foot. She does not take any medications. Diet: Variable, but per patient can be better, she knows \"which things she should eat to help with the weight loss\". Allergies- reviewed:   No Known Allergies      Medications- reviewed:   Current Outpatient Medications   Medication Sig    cyanocobalamin, vitamin B-12, (VITAMIN B-12 PO) Take  by mouth.    elderberry fruit (ELDERBERRY PO) Take  by mouth. Lactobacillus acidophilus (PROBIOTIC PO) Take  by mouth.    mv,calcium,min/iron/folic/vitK (ONE-A-DAY WOMEN'S COMPLETE PO) Take  by mouth. (Patient not taking: Reported on 2023)    cholecalciferol, vitamin D3, (VITAMIN D3 PO) Take  by mouth. (Patient not taking: Reported on 2023)    ibuprofen (MOTRIN) 600 mg tablet Take 1 Tab by mouth every six (6) hours as needed for Pain. (Patient not taking: Reported on 2023)     No current facility-administered medications for this visit. Past Medical History- reviewed:  Past Medical History:   Diagnosis Date    Asthma     AS A CHILD    History of blood transfusion     [address]  [address], NO REACTION    Thromboembolus ([address] 2005    PE         Past Surgical History- reviewed:   Past Surgical History:   Procedure Laterality Date    COLONOSCOPY N/A 2021    COLONOSCOPY performed by Andreina Lopez MD at [address] [place] [address]    HX  SECTION  2005    HX GYN  2004    MYOMECTOMY    HX GYN  2005    EMERGENCY SX FOR HEMATOMA         Family History - reviewed:  Family History   Problem Relation Age of Onset    Hypertension Mother     Pacemaker Father     OSTEOARTHRITIS Maternal Grandmother Anesth Problems Neg Hx          Social History - reviewed:  Social History     Socioeconomic History    Marital status:      Spouse name: Not on file    Number of children: Not on file    Years of education: Not on file    Highest education level: Not on file   Occupational History    Not on file   Tobacco Use    Smoking status: Never    Smokeless tobacco: Never   Substance and Sexual Activity    Alcohol use:  Yes     Alcohol/week: 1.7 standard drinks     Types: 2 Glasses of wine per week    Drug use: No    Sexual activity: Not on file   Other Topics Concern    Not on file   Social History Narrative    Not on file     Social Determinants of Health     Financial Resource Strain: Low Risk     Difficulty of Paying Living Expenses: Not hard at all   Food Insecurity: No Food Insecurity    Worried About Running Out of Food in the Last Year: Never true    Ran Out of Food in the Last Year: Never true   Transportation Needs: Not on file   Physical Activity: Not on file   Stress: Not on file   Social Connections: Not on file   Intimate Partner Violence: Not on file   Housing Stability: Not on file         Immunizations - reviewed:   Immunization History   Administered Date(s) Administered    COVID-19, PFIZER PURPLE top, DILUTE for use, (age 15 y+), IM, 30mcg/0.3mL 03/24/2021, 04/14/2021, 10/14/2021         Health Maintenance reviewed -  Pap smear 1/2023 with GYN  Mammogram 1/2023 by GYN  Colonoscopy 12/2021      Review of Systems   CONSTITUTIONAL: Denies: fever, chills  EYES: Denies: blurry vision  ENT: Denies: sore throat, nasal congestion  CARDIOVASCULAR: Denies: chest pain, dyspnea on exertion  RESPIRATORY: Denies: cough, shortness of breath  MUSCULOSKELETAL: +R knee pain      Physical Exam  Visit Vitals  /80 (BP 1 Location: Right arm, BP Patient Position: Sitting, BP Cuff Size: Large adult)   Pulse 66   Temp 98.1 °F (36.7 °C) (Oral)   Ht 5' 7\" (1.702 m)   Wt 210 lb 9.6 oz (95.5 kg)   LMP 04/23/2014   SpO2 98% BMI 32.98 kg/m²       General appearance - alert, well appearing, and in no distress  Ears - bilateral TM's and external ear canals normal  Nose - normal and patent, no erythema, discharge or polyps  Mouth - mucous membranes moist, pharynx normal without lesions  Neck - supple, no significant adenopathy  Chest - clear to auscultation, no wheezes, rales or rhonchi, symmetric air entry  Heart - normal rate, regular rhythm, normal S1, S2, no murmurs, rubs, clicks or gallops  Abdomen - soft, nontender, nondistended, no masses or organomegaly  Neurological - alert, oriented, normal speech, no focal findings or movement disorder noted  Musculoskeletal - no joint tenderness, deformity or swelling  Extremities - peripheral pulses normal, no pedal edema, no clubbing or cyanosis  Right Lower Extremity (injured extremity)  Knee Effusion:  None   Ecchymosis:  none   Knee ROM:  Full   Patella:  Patella does track normally  Patellar compression test: negative   Tenderness: NOne   Stability:  Lachman's test: negative  Posterior drawer: negative  Medial collateral ligament: negative  Lateral collateral ligament: negative     Lilibeth's Test:  negative with no joint line tenderness   Sensation:   intact to light touch   Pulses: normal DP and PT pulses       Skin - normal coloration and turgor, no rashes, no suspicious skin lesions noted      Assessment/Plan:    ICD-10-CM ICD-9-CM    1. Well woman exam (no gynecological exam)  Z00.00 V70.0 CBC W/O DIFF      METABOLIC PANEL, BASIC      LIPID PANEL      HEMOGLOBIN A1C WITH EAG      HEMOGLOBIN A1C WITH EAG      LIPID PANEL      METABOLIC PANEL, BASIC      CBC W/O DIFF      2. Prediabetes  R73.03 790.29 CBC W/O DIFF      METABOLIC PANEL, BASIC      HEMOGLOBIN A1C WITH EAG      HEMOGLOBIN A1C WITH EAG      METABOLIC PANEL, BASIC      CBC W/O DIFF      3.  Hyperlipidemia, unspecified hyperlipidemia type  E78.5 272.4 LIPID PANEL      HEMOGLOBIN A1C WITH EAG      HEMOGLOBIN A1C WITH EAG LIPID PANEL      4. Chronic pain of right knee  M25.561 719.46     G89.29 338.29           Preventive:  Counseled re: diet, exercise, healthy lifestyle  Appropriate labs, vaccines, imaging studies, and referrals ordered as listed above    2. Right knee pain  Intermittent and only aggravated by specific physical activity. No hx of fall or trauma. Advised to avoid exercises which provoke the pain  No need for XR    Follow-up and Dispositions    Return in about 1 year (around 2/6/2024) for Annual check up . I have discussed the diagnosis with the patient and the intended plan as seen in the above orders. Patient verbalized understanding of the plan and agrees with the plan. The patient has received an after-visit summary and questions were answered concerning future plans. I have discussed medication side effects and warnings with the patient as well. Informed patient to return to the office if new symptoms arise.         Casper Bowman MD  Family Medicine Resident

## 2023-02-06 NOTE — PROGRESS NOTES
Identified pt with two pt identifiers(name and ). Reviewed record in preparation for visit and have obtained necessary documentation. Chief Complaint   Patient presents with    Physical        Health Maintenance Due   Topic    DTaP/Tdap/Td series (1 - Tdap)    Shingles Vaccine (1 of 2)    COVID-19 Vaccine (4 - Booster for [employer] series)    Flu Vaccine (1)    Depression Screen        Visit Vitals  /80 (BP 1 Location: Right arm, BP Patient Position: Sitting, BP Cuff Size: Large adult)   Pulse 66   Temp 98.1 °F (36.7 °C) (Oral)   Ht 5' 7\" (1.702 m)   Wt 210 lb 9.6 oz (95.5 kg)   SpO2 98%   BMI 32.98 kg/m²         Coordination of Care Questionnaire:  :   1) Have you been to an emergency room, urgent care, or hospitalized since your last visit? If yes, where when, and reason for visit? no       2. Have seen or consulted any other health care provider since your last visit? If yes, where when, and reason for visit?   NO

## 2023-02-08 LAB
BUN SERPL-MCNC: 12 MG/DL (ref 6–24)
BUN/CREAT SERPL: 13 (ref 9–23)
CALCIUM SERPL-MCNC: 9.7 MG/DL (ref 8.7–10.2)
CHLORIDE SERPL-SCNC: 107 MMOL/L (ref 96–106)
CHOLEST SERPL-MCNC: 238 MG/DL (ref 100–199)
CO2 SERPL-SCNC: 23 MMOL/L (ref 20–29)
CREAT SERPL-MCNC: 0.94 MG/DL (ref 0.57–1)
EGFRCR SERPLBLD CKD-EPI 2021: 71 ML/MIN/1.73
ERYTHROCYTE [DISTWIDTH] IN BLOOD BY AUTOMATED COUNT: 13.7 % (ref 11.7–15.4)
EST. AVERAGE GLUCOSE BLD GHB EST-MCNC: 123 MG/DL
GLUCOSE SERPL-MCNC: 93 MG/DL (ref 70–99)
HBA1C MFR BLD: 5.9 % (ref 4.8–5.6)
HCT VFR BLD AUTO: 39.6 % (ref 34–46.6)
HDLC SERPL-MCNC: 65 MG/DL
HGB BLD-MCNC: 12.9 G/DL (ref 11.1–15.9)
IMP & REVIEW OF LAB RESULTS: NORMAL
LDLC SERPL CALC-MCNC: 162 MG/DL (ref 0–99)
MCH RBC QN AUTO: 28.1 PG (ref 26.6–33)
MCHC RBC AUTO-ENTMCNC: 32.6 G/DL (ref 31.5–35.7)
MCV RBC AUTO: 86 FL (ref 79–97)
PLATELET # BLD AUTO: 268 X10E3/UL (ref 150–450)
POTASSIUM SERPL-SCNC: 5.2 MMOL/L (ref 3.5–5.2)
RBC # BLD AUTO: 4.59 X10E6/UL (ref 3.77–5.28)
SODIUM SERPL-SCNC: 144 MMOL/L (ref 134–144)
TRIGL SERPL-MCNC: 64 MG/DL (ref 0–149)
VLDLC SERPL CALC-MCNC: 11 MG/DL (ref 5–40)
WBC # BLD AUTO: 7.3 X10E3/UL (ref 3.4–10.8)

## 2023-03-08 ENCOUNTER — OFFICE VISIT (OUTPATIENT)
Dept: FAMILY MEDICINE CLINIC | Age: 58
End: 2023-03-08

## 2023-03-08 VITALS
RESPIRATION RATE: 17 BRPM | HEIGHT: 67 IN | SYSTOLIC BLOOD PRESSURE: 129 MMHG | BODY MASS INDEX: 32.96 KG/M2 | WEIGHT: 210 LBS | HEART RATE: 56 BPM | OXYGEN SATURATION: 98 % | TEMPERATURE: 97.1 F | DIASTOLIC BLOOD PRESSURE: 74 MMHG

## 2023-03-08 DIAGNOSIS — G51.4 FACIAL TWITCHING: Primary | ICD-10-CM

## 2023-03-08 PROBLEM — Z90.710 H/O: HYSTERECTOMY: Status: ACTIVE | Noted: 2021-11-09

## 2023-03-08 NOTE — ASSESSMENT & PLAN NOTE
Acute, intermittent. Neuro exam intact. Likely 2/2 stress, no other sx suggestive of neurological dz. Recent labs ok.    - Return precautions given for worsening sx or any new sx

## 2023-03-08 NOTE — PROGRESS NOTES
[address] [facility] [address] [address]   Office [phone]  Fax [phone]     3/8/2023   Chief Complaint   Patient presents with    Other     Facial twitching on and off since last week. HPI:  Douglas Marley is a 65 y.o. female who presents to clinic today for following condition:     Facial twitching:   - Intermittent for 1 week  - On right side under eye or on cheek  - Can happen anytime  - No alleviating or aggravating factors  - No vision changes, weakness, numbness, paraesthesia, LOC, seizures  - No new medicines  - No Fhx of neurological disorders  - Increased stress in life from child going to college and pt planning out of state move    Patients past medical, surgical and family histories were reviewed. Allergies and Medications reviewed and updated. ROS: See HPI    Objective:  Vitals:    03/08/23 0819 03/08/23 0841   BP: 139/76 129/74   Pulse: (!) 56    Resp: 17    Temp: 97.1 °F (36.2 °C)    TempSrc: Temporal    SpO2: 98%    Weight: 210 lb (95.3 kg)    Height: 5' 7\" (1.702 m)      Body mass index is 32.89 kg/m². Physical Exam  General: Patient alert and oriented and in NAD  HEENT: PER/EOMI, no conjunctival pallor or scleral icterus. Lungs: Unlabored breathing  Abd: non-distended  Ext: No edema  Skin: No rashes or lesions noted on exposed skin,  Psych: Appropriate mood and affect  Neuro: Strength 5/5 in all extremities. Sensation intact throughout. CN 2-12 intact. DTR 2+ symmetric. Finger to nose test/Heel to shin test wnl. No results found for this or any previous visit (from the past 24 hour(s)). Assessment and Plan:  Diagnoses and all orders for this visit:    1. Facial twitching  Assessment & Plan:  Acute, intermittent. Neuro exam intact. Likely 2/2 stress, no other sx suggestive of neurological dz. Recent labs ok.    - Return precautions given for worsening sx or any new sx            I have discussed the aforementioned diagnoses and plan with the patient in detail. I have provided information in person and/or in AVS. All questions answered prior to discharge.      I discussed this patient with Dr. Pily Mendez (Attending Physician)   Signed By:  Tito Parker MD     Family Medicine Resident

## 2023-03-08 NOTE — PROGRESS NOTES
Chief Complaint   Patient presents with    Other     Facial twitching on and off since last week. 1. Have you been to the ER, urgent care clinic since your last visit? Hospitalized since your last visit? No    2. Have you seen or consulted any other health care providers outside of the [address] [place] [address] since your last visit? Include any pap smears or colon screening.  No

## 2024-02-13 DIAGNOSIS — Z12.31 ENCOUNTER FOR SCREENING MAMMOGRAM FOR MALIGNANT NEOPLASM OF BREAST: Primary | ICD-10-CM

## 2024-03-21 ENCOUNTER — OFFICE VISIT (OUTPATIENT)
Age: 59
End: 2024-03-21
Payer: MEDICAID

## 2024-03-21 VITALS
DIASTOLIC BLOOD PRESSURE: 73 MMHG | HEIGHT: 67 IN | WEIGHT: 214.8 LBS | TEMPERATURE: 98.3 F | HEART RATE: 56 BPM | BODY MASS INDEX: 33.71 KG/M2 | SYSTOLIC BLOOD PRESSURE: 110 MMHG | OXYGEN SATURATION: 99 %

## 2024-03-21 DIAGNOSIS — R73.03 PREDIABETES: ICD-10-CM

## 2024-03-21 DIAGNOSIS — Z00.00 WELL WOMAN EXAM (NO GYNECOLOGICAL EXAM): Primary | ICD-10-CM

## 2024-03-21 PROCEDURE — 99396 PREV VISIT EST AGE 40-64: CPT | Performed by: FAMILY MEDICINE

## 2024-03-21 RX ORDER — CHOLECALCIFEROL (VITAMIN D3) 25 MCG
1000 TABLET ORAL DAILY
COMMUNITY

## 2024-03-21 RX ORDER — LACTOBACILLUS ACIDOPHILUS 500MM CELL
1 CAPSULE ORAL DAILY
COMMUNITY

## 2024-03-21 SDOH — ECONOMIC STABILITY: FOOD INSECURITY: WITHIN THE PAST 12 MONTHS, THE FOOD YOU BOUGHT JUST DIDN'T LAST AND YOU DIDN'T HAVE MONEY TO GET MORE.: NEVER TRUE

## 2024-03-21 SDOH — ECONOMIC STABILITY: HOUSING INSECURITY
IN THE LAST 12 MONTHS, WAS THERE A TIME WHEN YOU DID NOT HAVE A STEADY PLACE TO SLEEP OR SLEPT IN A SHELTER (INCLUDING NOW)?: NO

## 2024-03-21 SDOH — ECONOMIC STABILITY: FOOD INSECURITY: WITHIN THE PAST 12 MONTHS, YOU WORRIED THAT YOUR FOOD WOULD RUN OUT BEFORE YOU GOT MONEY TO BUY MORE.: NEVER TRUE

## 2024-03-21 SDOH — ECONOMIC STABILITY: INCOME INSECURITY: HOW HARD IS IT FOR YOU TO PAY FOR THE VERY BASICS LIKE FOOD, HOUSING, MEDICAL CARE, AND HEATING?: SOMEWHAT HARD

## 2024-03-21 ASSESSMENT — PATIENT HEALTH QUESTIONNAIRE - PHQ9
1. LITTLE INTEREST OR PLEASURE IN DOING THINGS: NOT AT ALL
SUM OF ALL RESPONSES TO PHQ9 QUESTIONS 1 & 2: 0
SUM OF ALL RESPONSES TO PHQ QUESTIONS 1-9: 0
2. FEELING DOWN, DEPRESSED OR HOPELESS: NOT AT ALL
SUM OF ALL RESPONSES TO PHQ QUESTIONS 1-9: 0

## 2024-03-21 NOTE — PROGRESS NOTES
HPI:  Smitha Vasquez is a 58 y.o. female presenting for well woman exam.   Chief Complaint   Patient presents with    Annual Exam     Physical and requesting labwork. Mammogram tomorrow.           Concerns today:   No concerns today.       Chronic medical Conditions:   None    Lifestyle:   Occupation: realtor   Diet: Working on the diet and is trying to eat healthy  Exercise: Planning to exercise  Tobacco: no      Health Maintenance:  Pap smear: Not indicated (S/p Systerectomy)  Mammogram: Scheduled for tomorrow  Colonoscopy: 2021, the next one in .  DEXA scan: Not indicated    Immunizations:   Immunization History   Administered Date(s) Administered    COVID-19, PFIZER PURPLE top, DILUTE for use, (age 12 y+), 30mcg/0.3mL 2021, 2021, 10/14/2021         No Known Allergies      Current Outpatient Medications   Medication Sig    vitamin D (VITAMIN D3) 25 MCG (1000 UT) TABS tablet Take 1 tablet by mouth daily    Acidophilus Lactobacillus CAPS Take 1 capsule by mouth daily     No current facility-administered medications for this visit.         Past Medical History:   Diagnosis Date    Asthma     AS A CHILD    History of blood transfusion 2005    [facility], NO REACTION    Thromboembolus ([facility]) 2005    PE         Past Surgical History:   Procedure Laterality Date    BREAST REDUCTION SURGERY       SECTION  2005    COLONOSCOPY N/A 2021    COLONOSCOPY performed by Chirag Pardo MD at [facility] ENDOSCOPY    GYN  2004    MYOMECTOMY    GYN  2005    EMERGENCY SX FOR HEMATOMA         Family History   Problem Relation Age of Onset    Hypertension Mother     Pacemaker Father     Osteoarthritis Maternal Grandmother     Anesth Problems Neg Hx          Social History     Socioeconomic History    Marital status:      Spouse name: Not on file    Number of children: Not on file    Years of education: Not on file    Highest education level: Not on file   Occupational History    Not on

## 2024-03-21 NOTE — PROGRESS NOTES
Smitha Vasquez is a 58 y.o. female      Chief Complaint   Patient presents with    Annual Exam     Physical and requesting labwork.       \"Have you been to the ER, urgent care clinic since your last visit?  Hospitalized since your last visit?\"    NO    “Have you seen or consulted any other health care providers outside of [facility] since your last visit?”    NO       Have you had a mammogram?”   YES - Where: [facility] 3/22/2024 Nurse/CMA to request most recent records if not in the chart    Date of last Mammogram: 11/9/2021            Vitals:    03/21/24 1453   BP: 110/73   Site: Left Upper Arm   Position: Sitting   Cuff Size: Large Adult   Pulse: 56   Temp: 98.3 °F (36.8 °C)   TempSrc: Oral   SpO2: 99%   Weight: 97.4 kg (214 lb 12.8 oz)   Height: 1.702 m (5' 7.01\")           Health Maintenance Due   Topic Date Due    Hepatitis B vaccine (1 of 3 - 3-dose series) Never done    HIV screen  Never done    DTaP/Tdap/Td vaccine (1 - Tdap) Never done    Shingles vaccine (1 of 2) Never done    Flu vaccine (1) Never done    COVID-19 Vaccine (4 - 2023-24 season) 09/01/2023    Breast cancer screen  11/09/2023    A1C test (Diabetic or Prediabetic)  02/07/2024    Depression Screen  03/08/2024       Medication Reconciliation Completed, changes notes. Please Update medication list.

## 2024-03-22 LAB
BUN SERPL-MCNC: 11 MG/DL (ref 6–24)
BUN/CREAT SERPL: 14 (ref 9–23)
CALCIUM SERPL-MCNC: 10 MG/DL (ref 8.7–10.2)
CHLORIDE SERPL-SCNC: 101 MMOL/L (ref 96–106)
CHOLEST SERPL-MCNC: 236 MG/DL (ref 100–199)
CO2 SERPL-SCNC: 24 MMOL/L (ref 20–29)
CREAT SERPL-MCNC: 0.81 MG/DL (ref 0.57–1)
EGFRCR SERPLBLD CKD-EPI 2021: 85 ML/MIN/1.73
ERYTHROCYTE [DISTWIDTH] IN BLOOD BY AUTOMATED COUNT: 14.1 % (ref 11.7–15.4)
GLUCOSE SERPL-MCNC: 92 MG/DL (ref 70–99)
HBA1C MFR BLD: 6.1 % (ref 4.8–5.6)
HCT VFR BLD AUTO: 41.8 % (ref 34–46.6)
HDLC SERPL-MCNC: 63 MG/DL
HGB BLD-MCNC: 13.4 G/DL (ref 11.1–15.9)
IMP & REVIEW OF LAB RESULTS: NORMAL
LDLC SERPL CALC-MCNC: 145 MG/DL (ref 0–99)
MAMMOGRAPHY, EXTERNAL: NORMAL
MCH RBC QN AUTO: 28.1 PG (ref 26.6–33)
MCHC RBC AUTO-ENTMCNC: 32.1 G/DL (ref 31.5–35.7)
MCV RBC AUTO: 88 FL (ref 79–97)
PLATELET # BLD AUTO: 265 X10E3/UL (ref 150–450)
POTASSIUM SERPL-SCNC: 4.2 MMOL/L (ref 3.5–5.2)
RBC # BLD AUTO: 4.77 X10E6/UL (ref 3.77–5.28)
SODIUM SERPL-SCNC: 139 MMOL/L (ref 134–144)
SPECIMEN STATUS REPORT: NORMAL
TRIGL SERPL-MCNC: 159 MG/DL (ref 0–149)
VLDLC SERPL CALC-MCNC: 28 MG/DL (ref 5–40)
WBC # BLD AUTO: 9.1 X10E3/UL (ref 3.4–10.8)

## 2024-05-07 DIAGNOSIS — Z12.31 ENCOUNTER FOR SCREENING MAMMOGRAM FOR MALIGNANT NEOPLASM OF BREAST: ICD-10-CM

## 2024-05-30 DIAGNOSIS — Z12.31 ENCOUNTER FOR SCREENING MAMMOGRAM FOR MALIGNANT NEOPLASM OF BREAST: Primary | ICD-10-CM

## (undated) DEVICE — SNARE ENDOSCP M L240CM W27MM SHTH DIA2.4MM CHN 2.8MM OVL

## (undated) DEVICE — CUFF RMFG BP INF SZ 11 DISP -- LAWSON OEM ITEM 238915

## (undated) DEVICE — BAG SPEC BIOHZRD 10 X 10 IN --

## (undated) DEVICE — POLYP TRAP: Brand: TRAPEASE®

## (undated) DEVICE — CANN NASAL O2 CAPNOGRAPHY AD -- FILTERLINE

## (undated) DEVICE — 3M™ CUROS™ DISINFECTING CAP FOR NEEDLELESS CONNECTORS 270/CARTON 20 CARTONS/CASE CFF1-270: Brand: CUROS™

## (undated) DEVICE — SOLIDIFIER MEDC 1200ML -- CONVERT TO 356117

## (undated) DEVICE — KIT COLON W/ 1.1OZ LUB AND 2 END

## (undated) DEVICE — ELECTRODE,RADIOTRANSLUCENT,FOAM,3PK: Brand: MEDLINE

## (undated) DEVICE — SIMPLICITY FLUFF UNDERPAD 23X36, MODERATE: Brand: SIMPLICITY

## (undated) DEVICE — CONTAINER SPEC 20 ML LID NEUT BUFF FORMALIN 10 % POLYPR STS

## (undated) DEVICE — Device

## (undated) DEVICE — (D)SENSOR RMFG 02 PULS OXMTR -- DISC BY MFR USE ITEM 133445

## (undated) DEVICE — CATH IV AUTOGRD BC PNK 20GA 25 -- INSYTE

## (undated) DEVICE — 1200 GUARD II KIT W/5MM TUBE W/O VAC TUBE: Brand: GUARDIAN

## (undated) DEVICE — BAG BELONG PT PERS CLEAR HANDL

## (undated) DEVICE — SET GRAV CK VLV NEEDLESS ST 3 GANGED 4WAY STPCOCK HI FLO 10